# Patient Record
Sex: FEMALE | Race: WHITE | Employment: FULL TIME | URBAN - METROPOLITAN AREA
[De-identification: names, ages, dates, MRNs, and addresses within clinical notes are randomized per-mention and may not be internally consistent; named-entity substitution may affect disease eponyms.]

---

## 2022-03-18 PROBLEM — F32.1 MODERATE SINGLE CURRENT EPISODE OF MAJOR DEPRESSIVE DISORDER (HCC): Status: ACTIVE | Noted: 2018-01-03

## 2022-03-19 PROBLEM — F50.81 BINGE EATING DISORDER: Status: ACTIVE | Noted: 2018-06-26

## 2022-03-19 PROBLEM — E28.2 PCOS (POLYCYSTIC OVARIAN SYNDROME): Status: ACTIVE | Noted: 2018-01-03

## 2022-03-19 PROBLEM — D17.1 LIPOMA OF TORSO: Status: ACTIVE | Noted: 2018-01-16

## 2022-03-19 PROBLEM — F50.819 BINGE EATING DISORDER: Status: ACTIVE | Noted: 2018-06-26

## 2022-03-19 PROBLEM — E78.5 HYPERLIPIDEMIA: Status: ACTIVE | Noted: 2018-07-05

## 2022-03-20 PROBLEM — E66.9 OBESITY (BMI 35.0-39.9 WITHOUT COMORBIDITY): Status: ACTIVE | Noted: 2018-01-03

## 2023-05-20 RX ORDER — BUPROPION HYDROCHLORIDE 300 MG/1
1 TABLET ORAL EVERY MORNING
COMMUNITY
Start: 2022-09-12 | End: 2023-06-22

## 2023-06-22 RX ORDER — BUPROPION HYDROCHLORIDE 300 MG/1
TABLET ORAL
Qty: 30 TABLET | Refills: 1 | Status: SHIPPED | OUTPATIENT
Start: 2023-06-22

## 2023-06-22 NOTE — TELEPHONE ENCOUNTER
Vivek Corbin MD  Patient needs appointment  Office Visit on 08/02/2022   Component Date Value Ref Range Status    Glucose 08/02/2022 101 (H)  65 - 99 mg/dL Final    BUN 08/02/2022 10  6 - 20 mg/dL Final    Creatinine 08/02/2022 0.76  0.57 - 1.00 mg/dL Final    Est, Glomerular Filtration Rate 08/02/2022 105  >59 mL/min/1.73 Final    Bun/Cre Ratio 08/02/2022 13  9 - 23 NA Final    Sodium 08/02/2022 139  134 - 144 mmol/L Final    Potassium 08/02/2022 4.4  3.5 - 5.2 mmol/L Final    Chloride 08/02/2022 105  96 - 106 mmol/L Final    CO2 08/02/2022 22  20 - 29 mmol/L Final    Calcium 08/02/2022 8.8  8.7 - 10.2 mg/dL Final    Total Protein 08/02/2022 6.0  6.0 - 8.5 g/dL Final    Albumin 08/02/2022 4.0  3.8 - 4.8 g/dL Final    Globulin, Total 08/02/2022 2.0  1.5 - 4.5 g/dL Final    Albumin/Globulin Ratio 08/02/2022 2.0  1.2 - 2.2 NA Final    Total Bilirubin 08/02/2022 0.3  0.0 - 1.2 mg/dL Final    Alkaline Phosphatase 08/02/2022 81  44 - 121 IU/L Final    AST 08/02/2022 17  0 - 40 IU/L Final    ALT 08/02/2022 21  0 - 32 IU/L Final    WBC 08/02/2022 6.9  3.4 - 10.8 x10E3/uL Final    RBC 08/02/2022 4.66  3.77 - 5.28 x10E6/uL Final    Hemoglobin 08/02/2022 13.5  11.1 - 15.9 g/dL Final    Hematocrit 08/02/2022 41.0  34.0 - 46.6 % Final    MCV 08/02/2022 88  79 - 97 fL Final    MCH 08/02/2022 29.0  26.6 - 33.0 pg Final    MCHC 08/02/2022 32.9  31.5 - 35.7 g/dL Final    RDW 08/02/2022 13.6  11.7 - 15.4 % Final    Platelets 63/57/7467 311  150 - 450 x10E3/uL Final    Neutrophils % 08/02/2022 60  Not Estab. % Final    Lymphocytes % 08/02/2022 31  Not Estab. % Final    Monocytes % 08/02/2022 5  Not Estab. % Final    Eosinophils % 08/02/2022 3  Not Estab. % Final    Basophils % 08/02/2022 1  Not Estab. % Final    Neutrophils Absolute 08/02/2022 4.2  1.4 - 7.0 x10E3/uL Final    Lymphocytes Absolute 08/02/2022 2.2  0.7 - 3.1 x10E3/uL Final    Monocytes Absolute 08/02/2022 0.3  0.1 - 0.9 x10E3/uL Final    Eosinophils Absolute

## 2023-09-28 ENCOUNTER — OFFICE VISIT (OUTPATIENT)
Facility: CLINIC | Age: 33
End: 2023-09-28
Payer: COMMERCIAL

## 2023-09-28 VITALS
WEIGHT: 293 LBS | BODY MASS INDEX: 41.95 KG/M2 | OXYGEN SATURATION: 93 % | RESPIRATION RATE: 16 BRPM | DIASTOLIC BLOOD PRESSURE: 68 MMHG | HEART RATE: 71 BPM | TEMPERATURE: 97.5 F | HEIGHT: 70 IN | SYSTOLIC BLOOD PRESSURE: 102 MMHG

## 2023-09-28 DIAGNOSIS — F32.1 MAJOR DEPRESSIVE DISORDER, SINGLE EPISODE, MODERATE (HCC): ICD-10-CM

## 2023-09-28 DIAGNOSIS — E78.5 HYPERLIPIDEMIA, UNSPECIFIED HYPERLIPIDEMIA TYPE: ICD-10-CM

## 2023-09-28 DIAGNOSIS — Z00.00 ENCOUNTER FOR GENERAL ADULT MEDICAL EXAMINATION WITHOUT ABNORMAL FINDINGS: Primary | ICD-10-CM

## 2023-09-28 DIAGNOSIS — E28.2 PCOS (POLYCYSTIC OVARIAN SYNDROME): ICD-10-CM

## 2023-09-28 DIAGNOSIS — Z23 IMMUNIZATION DUE: ICD-10-CM

## 2023-09-28 DIAGNOSIS — Z13.1 DIABETES MELLITUS SCREENING: ICD-10-CM

## 2023-09-28 PROCEDURE — 99395 PREV VISIT EST AGE 18-39: CPT | Performed by: STUDENT IN AN ORGANIZED HEALTH CARE EDUCATION/TRAINING PROGRAM

## 2023-09-28 PROCEDURE — 99214 OFFICE O/P EST MOD 30 MIN: CPT | Performed by: STUDENT IN AN ORGANIZED HEALTH CARE EDUCATION/TRAINING PROGRAM

## 2023-09-28 RX ORDER — BUPROPION HYDROCHLORIDE 300 MG/1
300 TABLET ORAL EVERY MORNING
Qty: 90 TABLET | Refills: 1 | Status: SHIPPED | OUTPATIENT
Start: 2023-09-28

## 2023-09-28 ASSESSMENT — ENCOUNTER SYMPTOMS
ABDOMINAL PAIN: 0
NAUSEA: 0
VOMITING: 0
SHORTNESS OF BREATH: 0
COUGH: 0
RHINORRHEA: 0

## 2023-09-28 NOTE — PROGRESS NOTES
significant adenopathy  Breast-sees GYN  Chest - normal chest excursion, normal inspiratory and expiratory function. Clear to ausculation bilaterally. Heart - normal rate, regular rhythm, normal S1, S2, no murmurs, rubs, clicks or gallops, no extremity edema  Abdomen - +BS, benign, no organomegaly, non-tender to palpation, no guarding   GYN - sees GYN  Skin-no apparent rashes  Neuro - normal speech, moves all extremities, normal gait  Musculoskeletal - grossly normal joint and motor function. Patient is accompanied by self I have received verbal consent from Giselle Khoury to discuss any/all medical information while they are present in the room.

## 2024-01-25 DIAGNOSIS — F32.1 MAJOR DEPRESSIVE DISORDER, SINGLE EPISODE, MODERATE (HCC): ICD-10-CM

## 2024-01-25 RX ORDER — BUPROPION HYDROCHLORIDE 300 MG/1
300 TABLET ORAL EVERY MORNING
Qty: 90 TABLET | Refills: 1 | Status: SHIPPED | OUTPATIENT
Start: 2024-01-25

## 2024-01-25 NOTE — TELEPHONE ENCOUNTER
PCP: Analy Tom MD    Last appt: [unfilled]  No future appointments.    Requested Prescriptions     Pending Prescriptions Disp Refills    buPROPion (WELLBUTRIN XL) 300 MG extended release tablet 90 tablet 1     Sig: Take 1 tablet by mouth every morning       Prior labs and Blood pressures:  BP Readings from Last 3 Encounters:   09/28/23 102/68   08/31/22 118/78   08/02/22 118/82     Lab Results   Component Value Date/Time     08/02/2022 12:00 AM    K 4.4 08/02/2022 12:00 AM     08/02/2022 12:00 AM    CO2 22 08/02/2022 12:00 AM    BUN 10 08/02/2022 12:00 AM    GFRAA 133 12/22/2020 07:30 AM     No results found for: \"HBA1C\", \"[id number]\"  Lab Results   Component Value Date/Time    CHOL 221 08/02/2022 12:00 AM    HDL 65 08/02/2022 12:00 AM    VLDL 25 08/02/2022 12:00 AM     No results found for: \"VITD3\", \"VD3RIA\"    No results found for: \"TSH\", \"TSH2\", \"TSH3\"

## 2024-08-19 DIAGNOSIS — F32.1 MAJOR DEPRESSIVE DISORDER, SINGLE EPISODE, MODERATE (HCC): ICD-10-CM

## 2024-08-21 RX ORDER — BUPROPION HYDROCHLORIDE 300 MG/1
TABLET ORAL
Refills: 0 | OUTPATIENT
Start: 2024-08-21

## 2024-08-21 NOTE — TELEPHONE ENCOUNTER
I spoke to Ms. Davis.  She stated she has a new PCP outside of this office.  No need to call her.   Thank you.

## 2024-08-30 DIAGNOSIS — F32.1 MAJOR DEPRESSIVE DISORDER, SINGLE EPISODE, MODERATE (HCC): ICD-10-CM

## 2024-08-30 RX ORDER — BUPROPION HYDROCHLORIDE 300 MG/1
300 TABLET ORAL EVERY MORNING
Qty: 90 TABLET | Refills: 0 | OUTPATIENT
Start: 2024-08-30

## 2024-09-30 DIAGNOSIS — F32.1 MAJOR DEPRESSIVE DISORDER, SINGLE EPISODE, MODERATE (HCC): ICD-10-CM

## 2024-09-30 NOTE — TELEPHONE ENCOUNTER
I spoke to Pharmacist at [facility] to let them know that we are no longer PCP for Ms. Davis.  They have made a note of it.

## 2024-10-01 RX ORDER — BUPROPION HYDROCHLORIDE 300 MG/1
300 TABLET ORAL EVERY MORNING
Qty: 90 TABLET | Refills: 0 | OUTPATIENT
Start: 2024-10-01